# Patient Record
Sex: MALE | Race: WHITE | NOT HISPANIC OR LATINO | Employment: FULL TIME | URBAN - METROPOLITAN AREA
[De-identification: names, ages, dates, MRNs, and addresses within clinical notes are randomized per-mention and may not be internally consistent; named-entity substitution may affect disease eponyms.]

---

## 2021-12-23 ENCOUNTER — OFFICE VISIT (OUTPATIENT)
Dept: URGENT CARE | Facility: CLINIC | Age: 26
End: 2021-12-23
Payer: COMMERCIAL

## 2021-12-23 VITALS
HEART RATE: 90 BPM | HEIGHT: 73 IN | WEIGHT: 292 LBS | RESPIRATION RATE: 16 BRPM | DIASTOLIC BLOOD PRESSURE: 90 MMHG | SYSTOLIC BLOOD PRESSURE: 133 MMHG | BODY MASS INDEX: 38.7 KG/M2 | OXYGEN SATURATION: 98 % | TEMPERATURE: 98 F

## 2021-12-23 DIAGNOSIS — Z11.52 ENCOUNTER FOR SCREENING FOR COVID-19: Primary | ICD-10-CM

## 2021-12-23 LAB
CTP QC/QA: YES
SARS-COV-2 RDRP RESP QL NAA+PROBE: NEGATIVE

## 2021-12-23 PROCEDURE — U0002 COVID-19 LAB TEST NON-CDC: HCPCS | Mod: QW,S$GLB,, | Performed by: NURSE PRACTITIONER

## 2021-12-23 PROCEDURE — 99203 OFFICE O/P NEW LOW 30 MIN: CPT | Mod: S$GLB,,, | Performed by: NURSE PRACTITIONER

## 2021-12-23 PROCEDURE — 99203 PR OFFICE/OUTPT VISIT, NEW, LEVL III, 30-44 MIN: ICD-10-PCS | Mod: S$GLB,,, | Performed by: NURSE PRACTITIONER

## 2021-12-23 PROCEDURE — U0002: ICD-10-PCS | Mod: QW,S$GLB,, | Performed by: NURSE PRACTITIONER

## 2022-06-10 ENCOUNTER — OFFICE VISIT (OUTPATIENT)
Dept: FAMILY MEDICINE | Facility: CLINIC | Age: 27
End: 2022-06-10
Payer: COMMERCIAL

## 2022-06-10 VITALS
HEIGHT: 73 IN | BODY MASS INDEX: 39.23 KG/M2 | OXYGEN SATURATION: 98 % | HEART RATE: 132 BPM | WEIGHT: 296 LBS | SYSTOLIC BLOOD PRESSURE: 128 MMHG | DIASTOLIC BLOOD PRESSURE: 76 MMHG | TEMPERATURE: 102 F

## 2022-06-10 DIAGNOSIS — J02.9 PHARYNGITIS, UNSPECIFIED ETIOLOGY: ICD-10-CM

## 2022-06-10 DIAGNOSIS — J45.20 MILD INTERMITTENT ASTHMA, UNSPECIFIED WHETHER COMPLICATED: ICD-10-CM

## 2022-06-10 DIAGNOSIS — J40 BRONCHITIS: Primary | ICD-10-CM

## 2022-06-10 PROCEDURE — 99203 OFFICE O/P NEW LOW 30 MIN: CPT | Mod: S$GLB,,, | Performed by: FAMILY MEDICINE

## 2022-06-10 PROCEDURE — U0003 INFECTIOUS AGENT DETECTION BY NUCLEIC ACID (DNA OR RNA); SEVERE ACUTE RESPIRATORY SYNDROME CORONAVIRUS 2 (SARS-COV-2) (CORONAVIRUS DISEASE [COVID-19]), AMPLIFIED PROBE TECHNIQUE, MAKING USE OF HIGH THROUGHPUT TECHNOLOGIES AS DESCRIBED BY CMS-2020-01-R: HCPCS | Performed by: FAMILY MEDICINE

## 2022-06-10 PROCEDURE — 99203 PR OFFICE/OUTPT VISIT, NEW, LEVL III, 30-44 MIN: ICD-10-PCS | Mod: S$GLB,,, | Performed by: FAMILY MEDICINE

## 2022-06-10 PROCEDURE — U0005 INFEC AGEN DETEC AMPLI PROBE: HCPCS | Performed by: FAMILY MEDICINE

## 2022-06-10 RX ORDER — DOXYCYCLINE 100 MG/1
100 CAPSULE ORAL EVERY 12 HOURS
Qty: 20 CAPSULE | Refills: 0 | OUTPATIENT
Start: 2022-06-10 | End: 2022-07-03

## 2022-06-10 RX ORDER — ALBUTEROL SULFATE 90 UG/1
2 AEROSOL, METERED RESPIRATORY (INHALATION) EVERY 6 HOURS PRN
Qty: 18 G | Refills: 1 | Status: SHIPPED | OUTPATIENT
Start: 2022-06-10 | End: 2023-06-10

## 2022-06-10 RX ORDER — ONDANSETRON 4 MG/1
4 TABLET, FILM COATED ORAL EVERY 6 HOURS PRN
Qty: 10 TABLET | Refills: 0 | OUTPATIENT
Start: 2022-06-10 | End: 2022-07-03

## 2022-06-11 DIAGNOSIS — U07.1 COVID-19 VIRUS DETECTED: ICD-10-CM

## 2022-06-11 LAB — SARS-COV-2 RNA RESP QL NAA+PROBE: DETECTED

## 2022-06-12 PROBLEM — J45.20 MILD INTERMITTENT ASTHMA: Status: ACTIVE | Noted: 2022-06-12

## 2022-06-13 NOTE — PROGRESS NOTES
Subjective:       Patient ID: Thom Emmanuel III, III is a 26 y.o. male.    Chief Complaint: Sinus Problem    New patient visit.  Last year many years ago.  COVID immunization back in August of 2021. Bronchitis pharyngitis symptoms.  Does have mild intermittent asthma.  New baby born 2 days ago.  About go home from the hospital today.  Got sick yesterday morning with 101.9 degree fever.  Ears are okay.  Throat sore.  Vomited this morning.  Sinus pressure frontal.  Coughing a lot.  Some sputum this morning.  One episode of vomiting.  5-year-old had the flu 2 weeks ago.    Physical examination vital signs noted.  No acute distress.  Tympanic membranes without erythema.  Pharynx slightly reddened.  Neck without adenopathy.  Chest coarse breath sounds.    Heart regular rate rhythm.    Objective:        Assessment:       1. Bronchitis    2. Mild intermittent asthma, unspecified whether complicated    3. Pharyngitis, unspecified etiology        Plan:       Bronchitis  -     Cancel: COVID-19 Routine Screening    Mild intermittent asthma, unspecified whether complicated    Pharyngitis, unspecified etiology    Other orders  -     doxycycline (VIBRAMYCIN) 100 MG Cap; Take 1 capsule (100 mg total) by mouth every 12 (twelve) hours.  Dispense: 20 capsule; Refill: 0  -     albuterol (PROVENTIL HFA) 90 mcg/actuation inhaler; Inhale 2 puffs into the lungs every 6 (six) hours as needed for Wheezing. Rescue  Dispense: 18 g; Refill: 1  -     ondansetron (ZOFRAN) 4 MG tablet; Take 1 tablet (4 mg total) by mouth every 6 (six) hours as needed for Nausea.  Dispense: 10 tablet; Refill: 0  -     COVID-19 Routine Screening      A COVID nasal swab.  Isolation.  Vibramycin 100 mg b.i.d. for 10 days.  Albuterol HFA 2 puffs q.4-6 hours p.r.n. wheezing 1 with 2 refills.  Zofran 4 mg q.6 hours p.r.n. nausea.  Ten pills.  Isolation from mother

## 2022-07-03 ENCOUNTER — HOSPITAL ENCOUNTER (EMERGENCY)
Facility: HOSPITAL | Age: 27
Discharge: HOME | End: 2022-07-03
Attending: EMERGENCY MEDICINE
Payer: COMMERCIAL

## 2022-07-03 VITALS
OXYGEN SATURATION: 98 % | TEMPERATURE: 98 F | HEART RATE: 95 BPM | DIASTOLIC BLOOD PRESSURE: 66 MMHG | SYSTOLIC BLOOD PRESSURE: 105 MMHG | WEIGHT: 280 LBS | HEIGHT: 73 IN | RESPIRATION RATE: 17 BRPM | BODY MASS INDEX: 37.11 KG/M2

## 2022-07-03 DIAGNOSIS — T78.2XXA ANAPHYLAXIS, INITIAL ENCOUNTER: Primary | ICD-10-CM

## 2022-07-03 LAB
ALBUMIN SERPL BCP-MCNC: 4.8 G/DL (ref 3.5–5.2)
ALP SERPL-CCNC: 92 U/L (ref 55–135)
ALT SERPL W/O P-5'-P-CCNC: 63 U/L (ref 10–44)
ANION GAP SERPL CALC-SCNC: 10 MMOL/L (ref 8–16)
AST SERPL-CCNC: 38 U/L (ref 10–40)
BASOPHILS # BLD AUTO: 0.05 K/UL (ref 0–0.2)
BASOPHILS NFR BLD: 0.5 % (ref 0–1.9)
BILIRUB SERPL-MCNC: 0.5 MG/DL (ref 0.1–1)
BUN SERPL-MCNC: 15 MG/DL (ref 6–20)
CALCIUM SERPL-MCNC: 9.9 MG/DL (ref 8.7–10.5)
CHLORIDE SERPL-SCNC: 100 MMOL/L (ref 95–110)
CO2 SERPL-SCNC: 26 MMOL/L (ref 23–29)
CREAT SERPL-MCNC: 1 MG/DL (ref 0.5–1.4)
DIFFERENTIAL METHOD: NORMAL
EOSINOPHIL # BLD AUTO: 0.2 K/UL (ref 0–0.5)
EOSINOPHIL NFR BLD: 1.4 % (ref 0–8)
ERYTHROCYTE [DISTWIDTH] IN BLOOD BY AUTOMATED COUNT: 13 % (ref 11.5–14.5)
EST. GFR  (AFRICAN AMERICAN): >60 ML/MIN/1.73 M^2
EST. GFR  (NON AFRICAN AMERICAN): >60 ML/MIN/1.73 M^2
GLUCOSE SERPL-MCNC: 106 MG/DL (ref 70–110)
HCT VFR BLD AUTO: 44 % (ref 40–54)
HGB BLD-MCNC: 15.5 G/DL (ref 14–18)
IMM GRANULOCYTES # BLD AUTO: 0.03 K/UL (ref 0–0.04)
IMM GRANULOCYTES NFR BLD AUTO: 0.3 % (ref 0–0.5)
LYMPHOCYTES # BLD AUTO: 3.1 K/UL (ref 1–4.8)
LYMPHOCYTES NFR BLD: 28.4 % (ref 18–48)
MCH RBC QN AUTO: 29.7 PG (ref 27–31)
MCHC RBC AUTO-ENTMCNC: 35.2 G/DL (ref 32–36)
MCV RBC AUTO: 84 FL (ref 82–98)
MONOCYTES # BLD AUTO: 0.7 K/UL (ref 0.3–1)
MONOCYTES NFR BLD: 6.7 % (ref 4–15)
NEUTROPHILS # BLD AUTO: 6.8 K/UL (ref 1.8–7.7)
NEUTROPHILS NFR BLD: 62.7 % (ref 38–73)
NRBC BLD-RTO: 0 /100 WBC
PLATELET # BLD AUTO: 296 K/UL (ref 150–450)
PMV BLD AUTO: 12.3 FL (ref 9.2–12.9)
POTASSIUM SERPL-SCNC: 3.6 MMOL/L (ref 3.5–5.1)
PROT SERPL-MCNC: 8.2 G/DL (ref 6–8.4)
RBC # BLD AUTO: 5.22 M/UL (ref 4.6–6.2)
SODIUM SERPL-SCNC: 136 MMOL/L (ref 136–145)
WBC # BLD AUTO: 10.9 K/UL (ref 3.9–12.7)

## 2022-07-03 PROCEDURE — 96374 THER/PROPH/DIAG INJ IV PUSH: CPT

## 2022-07-03 PROCEDURE — 25000003 PHARM REV CODE 250

## 2022-07-03 PROCEDURE — 25000242 PHARM REV CODE 250 ALT 637 W/ HCPCS: Performed by: EMERGENCY MEDICINE

## 2022-07-03 PROCEDURE — 96361 HYDRATE IV INFUSION ADD-ON: CPT

## 2022-07-03 PROCEDURE — 63600175 PHARM REV CODE 636 W HCPCS: Performed by: EMERGENCY MEDICINE

## 2022-07-03 PROCEDURE — 25000003 PHARM REV CODE 250: Performed by: EMERGENCY MEDICINE

## 2022-07-03 PROCEDURE — 94640 AIRWAY INHALATION TREATMENT: CPT

## 2022-07-03 PROCEDURE — 99284 EMERGENCY DEPT VISIT MOD MDM: CPT | Mod: 25

## 2022-07-03 PROCEDURE — 96372 THER/PROPH/DIAG INJ SC/IM: CPT | Performed by: EMERGENCY MEDICINE

## 2022-07-03 PROCEDURE — 80053 COMPREHEN METABOLIC PANEL: CPT | Performed by: EMERGENCY MEDICINE

## 2022-07-03 PROCEDURE — 85025 COMPLETE CBC W/AUTO DIFF WBC: CPT | Performed by: EMERGENCY MEDICINE

## 2022-07-03 PROCEDURE — 94761 N-INVAS EAR/PLS OXIMETRY MLT: CPT

## 2022-07-03 RX ORDER — PREDNISONE 20 MG/1
40 TABLET ORAL DAILY
Qty: 10 TABLET | Refills: 0 | Status: SHIPPED | OUTPATIENT
Start: 2022-07-03 | End: 2022-07-08

## 2022-07-03 RX ORDER — METHYLPREDNISOLONE SOD SUCC 125 MG
125 VIAL (EA) INJECTION
Status: COMPLETED | OUTPATIENT
Start: 2022-07-03 | End: 2022-07-03

## 2022-07-03 RX ORDER — EPINEPHRINE 0.3 MG/.3ML
0.3 INJECTION SUBCUTANEOUS
Status: COMPLETED | OUTPATIENT
Start: 2022-07-03 | End: 2022-07-03

## 2022-07-03 RX ORDER — EPINEPHRINE 0.3 MG/.3ML
INJECTION SUBCUTANEOUS
Status: COMPLETED
Start: 2022-07-03 | End: 2022-07-03

## 2022-07-03 RX ORDER — DIPHENHYDRAMINE HCL 25 MG
25 CAPSULE ORAL EVERY 6 HOURS PRN
COMMUNITY

## 2022-07-03 RX ORDER — IPRATROPIUM BROMIDE AND ALBUTEROL SULFATE 2.5; .5 MG/3ML; MG/3ML
3 SOLUTION RESPIRATORY (INHALATION)
Status: COMPLETED | OUTPATIENT
Start: 2022-07-03 | End: 2022-07-03

## 2022-07-03 RX ORDER — SODIUM CHLORIDE 9 MG/ML
1000 INJECTION, SOLUTION INTRAVENOUS
Status: COMPLETED | OUTPATIENT
Start: 2022-07-03 | End: 2022-07-03

## 2022-07-03 RX ORDER — EPINEPHRINE 0.3 MG/.3ML
1 INJECTION SUBCUTANEOUS
Qty: 1 EACH | Refills: 3 | Status: SHIPPED | OUTPATIENT
Start: 2022-07-03 | End: 2023-07-03

## 2022-07-03 RX ORDER — FAMOTIDINE 10 MG/ML
40 INJECTION INTRAVENOUS
Status: COMPLETED | OUTPATIENT
Start: 2022-07-03 | End: 2022-07-03

## 2022-07-03 RX ADMIN — METHYLPREDNISOLONE SODIUM SUCCINATE 125 MG: 125 INJECTION, POWDER, FOR SOLUTION INTRAMUSCULAR; INTRAVENOUS at 06:07

## 2022-07-03 RX ADMIN — IPRATROPIUM BROMIDE AND ALBUTEROL SULFATE 3 ML: .5; 3 SOLUTION RESPIRATORY (INHALATION) at 07:07

## 2022-07-03 RX ADMIN — EPINEPHRINE 0.3 MG: 0.3 INJECTION SUBCUTANEOUS at 06:07

## 2022-07-03 RX ADMIN — FAMOTIDINE 40 MG: 10 INJECTION, SOLUTION INTRAVENOUS at 06:07

## 2022-07-03 RX ADMIN — SODIUM CHLORIDE 1000 ML: 0.9 INJECTION, SOLUTION INTRAVENOUS at 06:07

## 2022-07-04 NOTE — ED PROVIDER NOTES
Encounter Date: 7/3/2022       History     Chief Complaint   Patient presents with    Allergic Reaction     26-year-old male with history of mild intermittent asthma and anaphylactic reaction to peanut presents to the ER for acute onset of allergic reaction symptoms after eating boot in balls from a restaurant.  Patient reports he has had no issues with food and balls in the past and has had no issue with peanut oil which they reportedly dejesus in.  But developed within 30 minutes of eating the food lip swelling face swelling and redness with periorbital redness and itchy red eyes shortness of breath with throat swelling and wheezing as well as itchy palms and soles.  He also reports mild nausea.  No vomiting no dizziness or lightheadedness and no syncope.  No diarrhea he reports in the past he has gotten highs with allergic reactions but did not get hives with this.  He did not have an EpiPen available he did take 5 tablets of 25 mg Benadryl and came immediately to the ER.  He reports earlier today he is playing golf in the heat and was drinking alcoholic beverages        Review of patient's allergies indicates:   Allergen Reactions    Peanut Hives, Shortness Of Breath and Swelling     Past Medical History:   Diagnosis Date    Asthma, mild intermittent      Past Surgical History:   Procedure Laterality Date    FINGER SURGERY  9-10-13    right thumb    FRACTURE SURGERY       History reviewed. No pertinent family history.  Social History     Tobacco Use    Smoking status: Never Smoker    Smokeless tobacco: Never Used   Substance Use Topics    Alcohol use: No    Drug use: Never     Review of Systems   Constitutional: Negative for activity change, appetite change, chills, diaphoresis, fatigue and fever.   HENT: Positive for facial swelling. Negative for congestion, drooling, postnasal drip, rhinorrhea, sinus pain, sore throat, trouble swallowing and voice change.         Throat swelling   Eyes: Positive for  redness and itching.   Respiratory: Positive for shortness of breath. Negative for cough, chest tightness and wheezing.    Cardiovascular: Positive for palpitations. Negative for chest pain.   Gastrointestinal: Positive for nausea. Negative for abdominal pain, constipation, diarrhea and vomiting.   Musculoskeletal: Negative for neck pain and neck stiffness.   Skin: Positive for color change. Negative for rash.   Neurological: Negative for dizziness, weakness, light-headedness, numbness and headaches.   Psychiatric/Behavioral: Negative for agitation.   All other systems reviewed and are negative.      Physical Exam     Initial Vitals [07/03/22 1830]   BP Pulse Resp Temp SpO2   (!) 157/76 (!) 115 14 98.3 °F (36.8 °C) (!) 94 %      MAP       --         Physical Exam    Nursing note and vitals reviewed.  Constitutional: He appears well-developed and well-nourished. He is not diaphoretic. No distress.   HENT:   Head: Normocephalic and atraumatic.   Right Ear: External ear normal.   Left Ear: External ear normal.   Nose: Nose normal.   Mouth/Throat: Oropharynx is clear and moist. No oropharyngeal exudate.    erythema to the face with edema of the lips cheeks and periorbital areas as well as upper lower lids and scleral injection.  And tearing of the eyes.  No intraoral swelling normal phonation.   Eyes: EOM are normal. Pupils are equal, round, and reactive to light. Right eye exhibits discharge. Left eye exhibits discharge.   Injected sclera bilaterally mildly edematous upper and lower lids bilaterally with tearing   Neck: Neck supple. No tracheal deviation present.   No visualize neck swelling normal phonation normal ability to swallow   Normal range of motion.  Cardiovascular: Regular rhythm, normal heart sounds and intact distal pulses. Exam reveals no gallop and no friction rub.    No murmur heard.  Heart rate 120 sinus tach blood pressure 134/69   Pulmonary/Chest: No stridor. No respiratory distress. He has wheezes.  He has no rhonchi. He has no rales. He exhibits no tenderness.   Sats 96-98% on room air   Abdominal: Abdomen is soft. Bowel sounds are normal. He exhibits no distension and no mass. There is no abdominal tenderness. There is no rebound and no guarding.   Musculoskeletal:         General: No edema. Normal range of motion.      Cervical back: Normal range of motion and neck supple.     Neurological: He is alert and oriented to person, place, and time. He has normal strength. No cranial nerve deficit or sensory deficit.   Skin: Skin is warm and dry. No rash noted. No erythema. No pallor.   No pallor no rash    Erythema to cheeks   Psychiatric: He has a normal mood and affect. His behavior is normal. Judgment and thought content normal.         ED Course   Procedures  Labs Reviewed   COMPREHENSIVE METABOLIC PANEL - Abnormal; Notable for the following components:       Result Value    ALT 63 (*)     All other components within normal limits   CBC W/ AUTO DIFFERENTIAL          Imaging Results    None          Medications   EPINEPHrine (EPIPEN) 0.3 mg/0.3 mL pen injection 0.3 mg (0.3 mg Intramuscular Given 7/3/22 1835)   methylPREDNISolone sodium succinate injection 125 mg (125 mg Intramuscular Given 7/3/22 1837)   famotidine (PF) injection 40 mg (40 mg Intravenous Given 7/3/22 1838)   0.9%  NaCl infusion (0 mLs Intravenous Stopped 7/3/22 2006)   albuterol-ipratropium 2.5 mg-0.5 mg/3 mL nebulizer solution 3 mL (3 mLs Nebulization Given 7/3/22 1915)     Medical Decision Making:   Clinical Tests:   Lab Tests: Ordered and Reviewed  The following lab test(s) were unremarkable: CBC       <> Summary of Lab: ALT 63  ED Management:  26-year-old male with history of mild intermittent asthma and anaphylactic reaction to peanut presents to the ER for acute onset of allergic reaction symptoms after eating boot in balls from a restaurant.  Patient reports he has had no issues with food and balls in the past and has had no issue with  peanut oil which they reportedly dejesus in.  But developed within 30 minutes of eating the food lip swelling face swelling and redness with periorbital redness and itchy red eyes shortness of breath with throat swelling and wheezing as well as itchy palms and soles.  He also reports mild nausea.  No vomiting no dizziness or lightheadedness and no syncope.  No diarrhea he reports in the past he has gotten highs with allergic reactions but did not get hives with this.  He did not have an EpiPen available he did take 5 tablets of 25 mg Benadryl and came immediately to the ER.  He reports earlier today he is playing golf in the heat and was drinking alcoholic beverages  On physical exam patient was brought immediately to a room due to his severe symptoms he had a heart rate in the 120s normal blood pressure afebrile sats were 96% on room air respirations 12-14 patient had erythema to the face with edema of the lips cheeks and periorbital areas as well as upper lower lids and scleral injection.  And tearing of the eyes.  No intraoral swelling normal phonation.  Wheezes mildly bilaterally.  Oxygen saturations of 96-98% with deep breathing.  Soft nontender abdomen.  No hives.  He was given Solu-Medrol 125 mg IV, Pepcid 40 mg IV, a L fluids, and 0.3 mg of intramuscular epinephrine within several minutes patient reported that he was feeling improved.  The redness in his face has improved the swelling of the face is improved a reports his breathing is improved.  He will be observed for at least 4 hours.  CBC and CMP have been ordered and are normal.  Izabel Wise M.D.  8:56 PM 7/3/2022    Symptoms have all resolved patient has been sleeping comfortably normal vital signs no distress will be discharged home with EpiPen, prednisone, will take Pepcid and Benadryl for the next 3 days referred to Allergy immunology.  Izabel Wise M.D.  10:35 PM 7/3/2022               ED Course as of 07/03/22 2235   Sun Jul 03, 2022 2111  Patient doing well sleeping comfortably normal oxygen saturations all edema in the face and redness has resolved.  Izabel Wise M.D.  9:11 PM 7/3/2022   [RM]      ED Course User Index  [RM] Izabel Wise MD             Clinical Impression:   Final diagnoses:  [T78.2XXA] Anaphylaxis, initial encounter (Primary)                 Izabel Wise MD  07/03/22 1705

## 2022-07-10 ENCOUNTER — HOSPITAL ENCOUNTER (OUTPATIENT)
Dept: RADIOLOGY | Facility: HOSPITAL | Age: 27
Discharge: HOME | End: 2022-07-10
Attending: NURSE PRACTITIONER
Payer: COMMERCIAL

## 2022-07-10 ENCOUNTER — OFFICE VISIT (OUTPATIENT)
Dept: URGENT CARE | Facility: CLINIC | Age: 27
End: 2022-07-10
Payer: COMMERCIAL

## 2022-07-10 VITALS
HEART RATE: 77 BPM | BODY MASS INDEX: 38.17 KG/M2 | HEIGHT: 73 IN | WEIGHT: 288 LBS | DIASTOLIC BLOOD PRESSURE: 86 MMHG | SYSTOLIC BLOOD PRESSURE: 135 MMHG | RESPIRATION RATE: 18 BRPM | TEMPERATURE: 98 F | OXYGEN SATURATION: 95 %

## 2022-07-10 DIAGNOSIS — M79.644 PAIN IN RIGHT FINGER(S): ICD-10-CM

## 2022-07-10 DIAGNOSIS — S60.459A FOREIGN BODY OF FINGER WITH INFECTION, INITIAL ENCOUNTER: ICD-10-CM

## 2022-07-10 DIAGNOSIS — T63.691A MARINE ANIMAL STING, ACCIDENTAL OR UNINTENTIONAL, INITIAL ENCOUNTER: ICD-10-CM

## 2022-07-10 DIAGNOSIS — L03.011 CELLULITIS OF RIGHT LITTLE FINGER: ICD-10-CM

## 2022-07-10 DIAGNOSIS — L08.9 FOREIGN BODY OF FINGER WITH INFECTION, INITIAL ENCOUNTER: ICD-10-CM

## 2022-07-10 DIAGNOSIS — T63.691A MARINE ANIMAL STING, ACCIDENTAL OR UNINTENTIONAL, INITIAL ENCOUNTER: Primary | ICD-10-CM

## 2022-07-10 PROCEDURE — 29131 PR APPLY FINGER SPLINT,DYNAMIC: ICD-10-PCS | Mod: S$GLB,,, | Performed by: NURSE PRACTITIONER

## 2022-07-10 PROCEDURE — 29131 APPL FINGER SPLINT DYNAMIC: CPT | Mod: S$GLB,,, | Performed by: NURSE PRACTITIONER

## 2022-07-10 PROCEDURE — 99214 PR OFFICE/OUTPT VISIT, EST, LEVL IV, 30-39 MIN: ICD-10-PCS | Mod: 25,S$GLB,, | Performed by: NURSE PRACTITIONER

## 2022-07-10 PROCEDURE — 99214 OFFICE O/P EST MOD 30 MIN: CPT | Mod: 25,S$GLB,, | Performed by: NURSE PRACTITIONER

## 2022-07-10 PROCEDURE — 73130 X-RAY EXAM OF HAND: CPT | Mod: TC,RT

## 2022-07-10 RX ORDER — DOXYCYCLINE HYCLATE 100 MG
100 TABLET ORAL EVERY 12 HOURS
Qty: 20 TABLET | Refills: 0 | Status: SHIPPED | OUTPATIENT
Start: 2022-07-10 | End: 2022-07-20

## 2022-07-10 RX ORDER — MUPIROCIN 20 MG/G
OINTMENT TOPICAL 3 TIMES DAILY
Qty: 22 G | Refills: 0 | Status: SHIPPED | OUTPATIENT
Start: 2022-07-10 | End: 2022-07-17

## 2022-07-10 RX ORDER — CEFPODOXIME PROXETIL 100 MG/1
200 TABLET, FILM COATED ORAL EVERY 12 HOURS
Qty: 40 TABLET | Refills: 0 | Status: SHIPPED | OUTPATIENT
Start: 2022-07-10 | End: 2022-07-20

## 2022-07-10 RX ORDER — HYDROCODONE BITARTRATE AND ACETAMINOPHEN 5; 325 MG/1; MG/1
1 TABLET ORAL EVERY 6 HOURS PRN
Qty: 8 TABLET | Refills: 0 | Status: SHIPPED | OUTPATIENT
Start: 2022-07-10

## 2022-07-10 NOTE — PATIENT INSTRUCTIONS
Keep wound clean and dry. Use wound cleanser to clean wound. Apply mupirocin as directed. Change bandage daily and wear splint to protect finger until healed  Get xrays taken at local er. Someone will call you with results  Take antibiotics as ordered until complete. Separate two different antibiotics by at least 3 hours  Take otc ibuprofen for pain. May take norco for breakthrough pain  Follow up with your PCP  Return to clinic for any new concern

## 2022-07-10 NOTE — PROGRESS NOTES
"Subjective:       Patient ID: Thom Emmanuel III is a 26 y.o. male.    Vitals:  height is 6' 1" (1.854 m) and weight is 130.6 kg (288 lb). His oral temperature is 97.5 °F (36.4 °C). His blood pressure is 135/86 and his pulse is 77. His respiration is 18 and oxygen saturation is 95%.     Chief Complaint: Hand Pain    Yesterday the pt went fishing and a catfish stuck him in his right pinky finger. Pt doesn't know if it's still in there or not. Pt finger is red and swollen.     Hand Pain   The incident occurred 6 to 12 hours ago. The injury mechanism was a direct blow. The pain is present in the right fingers. The quality of the pain is described as aching. The pain is at a severity of 6/10. The pain is moderate. Pertinent negatives include no chest pain. He has tried NSAIDs for the symptoms. The treatment provided no relief.       Constitution: Negative for chills and fever.   Cardiovascular: Negative for chest pain, palpitations and sob on exertion.   Respiratory: Negative for chest tightness and shortness of breath.    Gastrointestinal: Negative for nausea and vomiting.   Musculoskeletal: Positive for pain. Negative for muscle ache.   Skin: Positive for puncture wound and erythema.   Neurological: Negative for dizziness, history of vertigo, light-headedness, disorientation and altered mental status.   Psychiatric/Behavioral: Negative for altered mental status, disorientation and confusion.       Objective:      Physical Exam   Constitutional: He is oriented to person, place, and time. He appears well-developed. He is cooperative.  Non-toxic appearance. He does not appear ill. No distress.   HENT:   Head: Normocephalic and atraumatic.   Ears:   Right Ear: Hearing and external ear normal.   Left Ear: Hearing and external ear normal.   Nose: Nose normal. No mucosal edema, rhinorrhea or nasal deformity. No epistaxis. Right sinus exhibits no maxillary sinus tenderness and no frontal sinus tenderness. Left sinus " exhibits no maxillary sinus tenderness and no frontal sinus tenderness.   Mouth/Throat: Uvula is midline, oropharynx is clear and moist and mucous membranes are normal. No trismus in the jaw. Normal dentition. No uvula swelling. No posterior oropharyngeal erythema.   Eyes: Conjunctivae and lids are normal. Right eye exhibits no discharge. Left eye exhibits no discharge. No scleral icterus.   Neck: Trachea normal and phonation normal. Neck supple.   Cardiovascular: Normal rate, regular rhythm, normal heart sounds and normal pulses.   Pulmonary/Chest: Effort normal and breath sounds normal. No respiratory distress.   Abdominal: Normal appearance.   Musculoskeletal: Normal range of motion.         General: No deformity. Normal range of motion.        Hands:    Neurological: He is alert and oriented to person, place, and time. He exhibits normal muscle tone. Coordination normal.   Skin: Skin is warm, dry, intact, not diaphoretic and not pale. Capillary refill takes 2 to 3 seconds. erythema   Psychiatric: His speech is normal and behavior is normal. Judgment and thought content normal.   Nursing note and vitals reviewed.        Assessment:       1. Marine animal sting, accidental or unintentional, initial encounter    2. Pain in right finger(s)    3. Cellulitis of right little finger          Plan:         Marine animal sting, accidental or unintentional, initial encounter  -     mupirocin (BACTROBAN) 2 % ointment; Apply topically 3 (three) times daily. for 7 days  Dispense: 22 g; Refill: 0  -     cefpodoxime (VANTIN) 100 MG tablet; Take 2 tablets (200 mg total) by mouth every 12 (twelve) hours. for 10 days  Dispense: 40 tablet; Refill: 0  -     doxycycline (VIBRA-TABS) 100 MG tablet; Take 1 tablet (100 mg total) by mouth every 12 (twelve) hours. for 10 days  Dispense: 20 tablet; Refill: 0  -     Splint application  -     XR HAND COMPLETE 3 VIEW RIGHT; Future; Expected date: 07/10/2022  -     E - OTHER    Pain in right  finger(s)  -     HYDROcodone-acetaminophen (NORCO) 5-325 mg per tablet; Take 1 tablet by mouth every 6 (six) hours as needed for Pain.  Dispense: 8 tablet; Refill: 0    Cellulitis of right little finger  -     cefpodoxime (VANTIN) 100 MG tablet; Take 2 tablets (200 mg total) by mouth every 12 (twelve) hours. for 10 days  Dispense: 40 tablet; Refill: 0  -     doxycycline (VIBRA-TABS) 100 MG tablet; Take 1 tablet (100 mg total) by mouth every 12 (twelve) hours. for 10 days  Dispense: 20 tablet; Refill: 0        I have discussed the test results and physical exam findings with the patient. We discussed strict symptom monitoring, treatment options, medication use, and follow up orders.  he verbalized understanding and agreement with the plan of care.        Keep wound clean and dry. Use wound cleanser to clean wound. Apply mupirocin as directed. Change bandage daily and wear splint to protect finger until healed  Get xrays taken at local er. Someone will call you with results  Take antibiotics as ordered until complete. Separate two different antibiotics by at least 3 hours  Take otc ibuprofen for pain. May take norco for breakthrough pain  Follow up with your PCP  Return to clinic for any new concern     07/10/2022=Right hand 3 views     CLINICAL DATA: Puncture wound, fifth digit     FINDINGS: 3 views demonstrate no fracture, dislocation, or osseous destructive lesion.     4 mm x 1 mm soft tissue foreign body is noted along the anterior aspect of the distal phalanx of the fifth finger near its base, approximately 3 mm deep to the skin surface.     IMPRESSION:  1. Radiopaque soft tissue foreign body anteriorly near the base of the distal phalanx of the fifth finger. By report, this reflects fish fin fragment.  2. No osseous abnormalities    Pt. Referred to orthopedic hand specialist. Pt. Contacted by phone and notified. Understanding verbalized.

## 2024-08-26 ENCOUNTER — OFFICE VISIT (OUTPATIENT)
Dept: URGENT CARE | Facility: CLINIC | Age: 29
End: 2024-08-26
Payer: COMMERCIAL

## 2024-08-26 VITALS
SYSTOLIC BLOOD PRESSURE: 130 MMHG | RESPIRATION RATE: 16 BRPM | TEMPERATURE: 97 F | HEART RATE: 74 BPM | OXYGEN SATURATION: 97 % | DIASTOLIC BLOOD PRESSURE: 87 MMHG

## 2024-08-26 DIAGNOSIS — J02.9 SORE THROAT: Primary | ICD-10-CM

## 2024-08-26 DIAGNOSIS — U07.1 COVID-19: ICD-10-CM

## 2024-08-26 LAB
CTP QC/QA: YES
FLUAV AG NPH QL: NEGATIVE
FLUBV AG NPH QL: NEGATIVE
S PYO RRNA THROAT QL PROBE: NEGATIVE
SARS-COV-2 AG RESP QL IA.RAPID: POSITIVE

## 2024-08-26 PROCEDURE — 87804 INFLUENZA ASSAY W/OPTIC: CPT | Mod: QW,,, | Performed by: STUDENT IN AN ORGANIZED HEALTH CARE EDUCATION/TRAINING PROGRAM

## 2024-08-26 PROCEDURE — 87811 SARS-COV-2 COVID19 W/OPTIC: CPT | Mod: QW,S$GLB,, | Performed by: STUDENT IN AN ORGANIZED HEALTH CARE EDUCATION/TRAINING PROGRAM

## 2024-08-26 PROCEDURE — 99214 OFFICE O/P EST MOD 30 MIN: CPT | Mod: S$GLB,,, | Performed by: STUDENT IN AN ORGANIZED HEALTH CARE EDUCATION/TRAINING PROGRAM

## 2024-08-26 PROCEDURE — 87880 STREP A ASSAY W/OPTIC: CPT | Mod: QW,,, | Performed by: STUDENT IN AN ORGANIZED HEALTH CARE EDUCATION/TRAINING PROGRAM

## 2024-08-26 RX ORDER — CETIRIZINE HYDROCHLORIDE 10 MG/1
10 TABLET ORAL DAILY
Qty: 30 TABLET | Refills: 0 | Status: SHIPPED | OUTPATIENT
Start: 2024-08-26

## 2024-08-26 RX ORDER — PROMETHAZINE HYDROCHLORIDE AND DEXTROMETHORPHAN HYDROBROMIDE 6.25; 15 MG/5ML; MG/5ML
5 SYRUP ORAL EVERY 4 HOURS PRN
Qty: 118 ML | Refills: 0 | Status: SHIPPED | OUTPATIENT
Start: 2024-08-26 | End: 2024-09-05

## 2024-08-26 RX ORDER — AZELASTINE 1 MG/ML
2 SPRAY, METERED NASAL 2 TIMES DAILY
Qty: 30 ML | Refills: 0 | Status: SHIPPED | OUTPATIENT
Start: 2024-08-26 | End: 2025-08-26

## 2024-08-26 NOTE — PROGRESS NOTES
Subjective:      Patient ID: Thom Emmanuel III is a 28 y.o. male.    Vitals:  temperature is 97 °F (36.1 °C). His blood pressure is 130/87 and his pulse is 74. His respiration is 16 and oxygen saturation is 97%.     Chief Complaint: URI    Patient is a 28-year-old male with a past medical history of asthma who presents to clinic for evaluation of URI related symptoms.  Patient reports symptoms times 2-3 days now.  Patient reports over-the-counter Benadryl, ibuprofen, and emergen-c with mild relief to symptoms.  Patient reports no recent or known sick exposure however states he wife works as a teacher in his child is back in school.  Patient reports that he is vaccinated.        Constitution: Positive for chills and fatigue.   HENT:  Positive for congestion, postnasal drip and sore throat. Negative for ear pain.    Neck: neck negative.   Cardiovascular: Negative.  Negative for chest pain and palpitations.   Eyes: Negative.    Respiratory:  Positive for cough. Negative for chest tightness, sputum production and shortness of breath.    Gastrointestinal: Negative.  Negative for abdominal pain, nausea, vomiting and diarrhea.   Endocrine: negative.   Genitourinary: Negative.    Musculoskeletal:  Positive for muscle ache.   Skin: Negative.  Negative for color change, pale, rash and erythema.   Allergic/Immunologic: Negative.    Neurological:  Positive for headaches. Negative for dizziness, light-headedness, passing out, disorientation and altered mental status.   Hematologic/Lymphatic: Negative.    Psychiatric/Behavioral: Negative.  Negative for altered mental status, disorientation and confusion.       Objective:     Physical Exam   Constitutional: He is oriented to person, place, and time. He appears well-developed. He is cooperative.  Non-toxic appearance. He does not appear ill. No distress.   HENT:   Head: Normocephalic and atraumatic.   Ears:   Right Ear: Hearing, tympanic membrane, external ear and ear canal  normal.   Left Ear: Hearing, tympanic membrane, external ear and ear canal normal.   Nose: Nose normal. No mucosal edema, rhinorrhea, nasal deformity or congestion. No epistaxis. Right sinus exhibits no maxillary sinus tenderness and no frontal sinus tenderness. Left sinus exhibits no maxillary sinus tenderness and no frontal sinus tenderness.   Mouth/Throat: Uvula is midline, oropharynx is clear and moist and mucous membranes are normal. Mucous membranes are moist. No trismus in the jaw. Normal dentition. No uvula swelling. No oropharyngeal exudate or posterior oropharyngeal erythema. Oropharynx is clear.   Eyes: Conjunctivae and lids are normal. Pupils are equal, round, and reactive to light. Right eye exhibits no discharge. Left eye exhibits no discharge. No scleral icterus.   Neck: Trachea normal and phonation normal. Neck supple. No neck rigidity present.   Cardiovascular: Normal rate, regular rhythm, normal heart sounds and normal pulses.   Pulmonary/Chest: Effort normal and breath sounds normal. No respiratory distress (Unlabored.  Equal rise and fall of chest.  Able speak in full complete sentences.  No adventitious breath sounds noted.). He has no wheezes. He has no rhonchi. He has no rales.   Abdominal: Normal appearance and bowel sounds are normal. He exhibits no distension. Soft. There is no abdominal tenderness.   Musculoskeletal: Normal range of motion.         General: Normal range of motion.      Cervical back: He exhibits no tenderness.   Lymphadenopathy:     He has no cervical adenopathy.   Neurological: He is alert and oriented to person, place, and time. He exhibits normal muscle tone.   Skin: Skin is warm, dry, intact, not diaphoretic, not pale and no rash. Capillary refill takes less than 2 seconds. No erythema   Psychiatric: His speech is normal and behavior is normal. Judgment and thought content normal.   Nursing note and vitals reviewed.      Assessment:     1. Sore throat    2. COVID-19         Plan:       Sore throat  -     SARS Coronavirus 2 Antigen, POCT Manual Read  -     POCT Influenza A/B Rapid Antigen  -     POCT rapid strep A    COVID-19    Other orders  -     cetirizine (ZYRTEC) 10 MG tablet; Take 1 tablet (10 mg total) by mouth once daily.  Dispense: 30 tablet; Refill: 0  -     azelastine (ASTELIN) 137 mcg (0.1 %) nasal spray; 2 sprays (274 mcg total) by Nasal route 2 (two) times daily.  Dispense: 30 mL; Refill: 0  -     promethazine-dextromethorphan (PROMETHAZINE-DM) 6.25-15 mg/5 mL Syrp; Take 5 mLs by mouth every 4 (four) hours as needed (Cough).  Dispense: 118 mL; Refill: 0                Labs:  COVID positive.  Influenza a and B negative.  Rapid strep negative.  Quarantine as directed.  Quarantine information provided to patient.  COVID recommendations provided.  (Vitamin-C 2000 mg daily, vitamin D3 1000 IU daily, Pepcid 40 mg daily, Zyrtec 10 mg daily, zinc 50 mg daily)  Tylenol per package instructions for any pain or fever.  May rotate with Motrin if unable to control pain or fever along with Tylenol.  Monitor home SpO2 and present to emergency department with readings less than 92%.  Take medications as prescribed.  Assure adequate hydration.  Follow-up with PCP in 1-2 days.  Return to clinic as needed.  To ED for any new or acutely worsening symptoms including but not limited to chest pain, palpitations, shortness of breath, or fever greater than 103° F.  Patient in agreement with plan of care.    DISCLAIMER: Please note that my documentation in this Electronic Healthcare Record was produced using speech recognition software and therefore may contain errors related to that software system.These could include grammar, punctuation and spelling errors or the inclusion/exclusion of phrases that were not intended. Garbled syntax, mangled pronouns, and other bizarre constructions may be attributed to that software system.

## 2024-08-26 NOTE — LETTER
August 26, 2024      [facility]  [address]  [facility] [address]  Phone: [phone]       Patient: Thom Emmanuel   YOB: 1995  Date of Visit: 08/26/2024    To Whom It May Concern:    Adrian Emmanuel  was at [facility] on 08/26/2024. The patient may return to work/school on 08/28/2024 with no restrictions (must be fever free and symptoms improving for 24 hours). If you have any questions or concerns, or if I can be of further assistance, please do not hesitate to contact me.    Sincerely,    Demarco Mendes NP

## 2025-03-10 ENCOUNTER — HOSPITAL ENCOUNTER (EMERGENCY)
Facility: HOSPITAL | Age: 30
Discharge: HOME | End: 2025-03-10
Attending: EMERGENCY MEDICINE
Payer: COMMERCIAL

## 2025-03-10 VITALS
BODY MASS INDEX: 38.43 KG/M2 | HEART RATE: 76 BPM | WEIGHT: 290 LBS | HEIGHT: 73 IN | RESPIRATION RATE: 18 BRPM | DIASTOLIC BLOOD PRESSURE: 77 MMHG | SYSTOLIC BLOOD PRESSURE: 120 MMHG | TEMPERATURE: 99 F | OXYGEN SATURATION: 98 %

## 2025-03-10 DIAGNOSIS — R07.9 CHEST PAIN: ICD-10-CM

## 2025-03-10 DIAGNOSIS — R00.2 PALPITATIONS: Primary | ICD-10-CM

## 2025-03-10 LAB
ALBUMIN SERPL BCP-MCNC: 4.2 G/DL (ref 3.5–5.2)
ALP SERPL-CCNC: 108 U/L (ref 40–150)
ALT SERPL W/O P-5'-P-CCNC: 54 U/L (ref 10–44)
ANION GAP SERPL CALC-SCNC: 12 MMOL/L (ref 8–16)
AST SERPL-CCNC: 29 U/L (ref 10–40)
BASOPHILS # BLD AUTO: 0.03 K/UL (ref 0–0.2)
BASOPHILS NFR BLD: 0.5 % (ref 0–1.9)
BILIRUB SERPL-MCNC: 0.4 MG/DL (ref 0.1–1)
BUN SERPL-MCNC: 14 MG/DL (ref 6–20)
CALCIUM SERPL-MCNC: 9.7 MG/DL (ref 8.7–10.5)
CHLORIDE SERPL-SCNC: 105 MMOL/L (ref 95–110)
CO2 SERPL-SCNC: 22 MMOL/L (ref 23–29)
CREAT SERPL-MCNC: 0.8 MG/DL (ref 0.5–1.4)
DIFFERENTIAL METHOD BLD: NORMAL
EOSINOPHIL # BLD AUTO: 0.2 K/UL (ref 0–0.5)
EOSINOPHIL NFR BLD: 3.7 % (ref 0–8)
ERYTHROCYTE [DISTWIDTH] IN BLOOD BY AUTOMATED COUNT: 12.7 % (ref 11.5–14.5)
EST. GFR  (NO RACE VARIABLE): >60 ML/MIN/1.73 M^2
GLUCOSE SERPL-MCNC: 109 MG/DL (ref 70–110)
HCT VFR BLD AUTO: 43.3 % (ref 40–54)
HGB BLD-MCNC: 14.8 G/DL (ref 14–18)
IMM GRANULOCYTES # BLD AUTO: 0.02 K/UL (ref 0–0.04)
IMM GRANULOCYTES NFR BLD AUTO: 0.3 % (ref 0–0.5)
LYMPHOCYTES # BLD AUTO: 1.7 K/UL (ref 1–4.8)
LYMPHOCYTES NFR BLD: 27.8 % (ref 18–48)
MCH RBC QN AUTO: 29.8 PG (ref 27–31)
MCHC RBC AUTO-ENTMCNC: 34.2 G/DL (ref 32–36)
MCV RBC AUTO: 87 FL (ref 82–98)
MONOCYTES # BLD AUTO: 0.4 K/UL (ref 0.3–1)
MONOCYTES NFR BLD: 7.1 % (ref 4–15)
NEUTROPHILS # BLD AUTO: 3.6 K/UL (ref 1.8–7.7)
NEUTROPHILS NFR BLD: 60.6 % (ref 38–73)
NRBC BLD-RTO: 0 /100 WBC
PLATELET # BLD AUTO: 177 K/UL (ref 150–450)
PMV BLD AUTO: 12.7 FL (ref 9.2–12.9)
POTASSIUM SERPL-SCNC: 3.9 MMOL/L (ref 3.5–5.1)
PROT SERPL-MCNC: 8 G/DL (ref 6–8.4)
RBC # BLD AUTO: 4.97 M/UL (ref 4.6–6.2)
SODIUM SERPL-SCNC: 139 MMOL/L (ref 136–145)
TROPONIN I SERPL DL<=0.01 NG/ML-MCNC: <0.006 NG/ML (ref 0–0.03)
WBC # BLD AUTO: 5.93 K/UL (ref 3.9–12.7)

## 2025-03-10 PROCEDURE — 93010 ELECTROCARDIOGRAM REPORT: CPT | Mod: ,,, | Performed by: INTERNAL MEDICINE

## 2025-03-10 PROCEDURE — 85025 COMPLETE CBC W/AUTO DIFF WBC: CPT | Performed by: EMERGENCY MEDICINE

## 2025-03-10 PROCEDURE — 36415 COLL VENOUS BLD VENIPUNCTURE: CPT | Performed by: EMERGENCY MEDICINE

## 2025-03-10 PROCEDURE — 99285 EMERGENCY DEPT VISIT HI MDM: CPT | Mod: 25

## 2025-03-10 PROCEDURE — 80053 COMPREHEN METABOLIC PANEL: CPT | Performed by: EMERGENCY MEDICINE

## 2025-03-10 PROCEDURE — 84484 ASSAY OF TROPONIN QUANT: CPT | Performed by: EMERGENCY MEDICINE

## 2025-03-10 PROCEDURE — 93005 ELECTROCARDIOGRAM TRACING: CPT

## 2025-03-10 NOTE — ED PROVIDER NOTES
Encounter Date: 3/10/2025       History     Chief Complaint   Patient presents with    Chest Pain     Sudden onset this am with SOB       29-year-old male no significant medical history presents with palpitations that began this morning when he was reaching into the drive for some clothing.  Sudden onset, lasted about 5 minutes.  He felt short of breath when this occurred.  He felt a little confused.  Symptoms have now completely resolved.  He has a multiple similar episodes over the last few months but never went to the doctor.  He has no primary care physician.  Drinks occasionally, no cigarettes no drugs.  Patient reports that he travels a lot for work.  He has been having to go to [place] every other week for about a week at a time.  He has an 8-year-old and 2-year-old.  He becomes tearful when discussing this.  States work has been stressful.  Triage note states chest pain but the patient reports primarily palpitations rather than chest heaviness or pressure.  He has no shortness of breath at this time no ripping or tearing pain no history of PE or DVT.  Family history of CAD but in much older relatives.    The history is provided by the patient.     Review of patient's allergies indicates:   Allergen Reactions    Peanut Hives, Shortness Of Breath and Swelling     Past Medical History:   Diagnosis Date    Asthma, mild intermittent      Past Surgical History:   Procedure Laterality Date    FINGER SURGERY  9-10-13    right thumb    FRACTURE SURGERY       No family history on file.  Social History[1]  Review of Systems   Respiratory:  Positive for shortness of breath.    Cardiovascular:  Positive for palpitations. Negative for chest pain.       Physical Exam     Initial Vitals   BP Pulse Resp Temp SpO2   03/10/25 0648 03/10/25 0648 03/10/25 0648 03/10/25 0651 03/10/25 0648   (!) 160/98 86 (!) 22 98 °F (36.7 °C) 100 %      MAP       --                Physical Exam    Nursing note and vitals  reviewed.  Constitutional: He appears well-developed and well-nourished. He is not diaphoretic.  Non-toxic appearance. He does not have a sickly appearance. He does not appear ill. No distress.   HENT:   Head: Normocephalic and atraumatic.   Eyes: EOM are normal.   Neck: Neck supple.   Normal range of motion.  Cardiovascular:  Normal rate, regular rhythm and normal heart sounds.     Exam reveals no gallop and no friction rub.       No murmur heard.  Pulmonary/Chest: Breath sounds normal. No respiratory distress. He has no wheezes. He has no rhonchi. He has no rales.   Musculoskeletal:         General: Normal range of motion.      Cervical back: Normal range of motion and neck supple. No rigidity. Normal range of motion.     Neurological: He is alert and oriented to person, place, and time.   Skin: Skin is warm and dry. No rash noted.   Psychiatric: His behavior is normal. Judgment and thought content normal. He does not exhibit a depressed mood.   Tearful but denies feeling depressed         ED Course   Procedures  Labs Reviewed   COMPREHENSIVE METABOLIC PANEL - Abnormal       Result Value    Sodium 139      Potassium 3.9      Chloride 105      CO2 22 (*)     Glucose 109      BUN 14      Creatinine 0.8      Calcium 9.7      Total Protein 8.0      Albumin 4.2      Total Bilirubin 0.4      Alkaline Phosphatase 108      AST 29      ALT 54 (*)     eGFR >60      Anion Gap 12     CBC W/ AUTO DIFFERENTIAL    WBC 5.93      RBC 4.97      Hemoglobin 14.8      Hematocrit 43.3      MCV 87      MCH 29.8      MCHC 34.2      RDW 12.7      Platelets 177      MPV 12.7      Immature Granulocytes 0.3      Gran # (ANC) 3.6      Immature Grans (Abs) 0.02      Lymph # 1.7      Mono # 0.4      Eos # 0.2      Baso # 0.03      nRBC 0      Gran % 60.6      Lymph % 27.8      Mono % 7.1      Eosinophil % 3.7      Basophil % 0.5      Differential Method Automated     TROPONIN I    Troponin I <0.006            Imaging Results              X-Ray  Chest PA And Lateral (Final result)  Result time 03/10/25 08:02:07      Final result by Clyde Bolden MD (03/10/25 08:02:07)                   Impression:      Negative chest.      Electronically signed by: Clyde Bolden MD  Date:    03/10/2025  Time:    08:02               Narrative:    EXAMINATION:  XR CHEST PA AND LATERAL    CLINICAL HISTORY:  Chest Pain;    TECHNIQUE:  PA and lateral views of the chest were performed.    COMPARISON:  None    FINDINGS:  The cardiomediastinal silhouette is within normal limits.  The lungs are well expanded without consolidation or pleural effusion.                                       Medications - No data to display  Medical Decision Making  29-year-old male no major medical problems, no primary care provider presents with an episode of palpitations that lasted for 5 minutes this morning.  Some associated chest discomfort and shortness of breath.  Symptoms resolved on their own.  He felt a little confused during this episode.  All symptoms resolved on arrival.  He reports multiple episodes over the last several months.  He has not gone to the ER or seen a doctor for this.  ER workup at this time is unremarkable with a normal EKG.  No PE risk factors I do not suspect dissection.  Troponin is negative.  Patient has to leave frequently and go out of state for work for about a week at a time and is away from his 2 children.  This is obviously distressing for the patient as he becomes tearful when discussing this.  Consideration is made for anxiety causing his symptoms but it does seem like he has had symptoms for several months so something like SVT is also concern.  Follow up primary care.  He is referred to Dr. Jeffers who is the family physician.  No indication for further testing or hospitalization.  Return precautions discussed with the patient and mother at the bedside.    Amount and/or Complexity of Data Reviewed  Labs: ordered. Decision-making details documented in  ED Course.  Radiology: ordered. Decision-making details documented in ED Course.  ECG/medicine tests: ordered and independent interpretation performed. Decision-making details documented in ED Course.               ED Course as of 03/10/25 0849   Mon Mar 10, 2025   0703 BP(!): 160/98 [EF]   0703 Temp: 98 °F (36.7 °C) [EF]   0703 Temp Source: Oral [EF]   0703 Pulse: 86 [EF]   0703 Resp(!): 22 [EF]   0703 SpO2: 100 % [EF]   0749 Sinus rhythm normal axis no ST elevation or depression or T-wave inversion independently interpreted 78 beats per minute [EF]   0813 X-Ray Chest PA And Lateral [EF]   0825 Troponin I: <0.006 [EF]      ED Course User Index  [EF] Stephen Luna MD                           Clinical Impression:  Final diagnoses:  [R07.9] Chest pain  [R00.2] Palpitations (Primary)          ED Disposition Condition    Discharge Stable          ED Prescriptions    None       Follow-up Information       Follow up With Specialties Details Why Contact Info Additional Information    Bryan Jeffers III, MD Family Medicine Schedule an appointment as soon as possible for a visit   [address] [facility]  [address]  [facility] [address]  [phone]       [facility]  As needed, If symptoms worsen [address] [place] [address] [address] [place] [id number] 1st floor                 [1]   Social History  Tobacco Use    Smoking status: Never    Smokeless tobacco: Never   Substance Use Topics    Alcohol use: No    Drug use: Never        Stephen Luna MD  03/10/25 0826

## 2025-03-11 LAB
OHS QRS DURATION: 80 MS
OHS QTC CALCULATION: 412 MS

## 2025-07-10 ENCOUNTER — OFFICE VISIT (OUTPATIENT)
Dept: FAMILY MEDICINE | Facility: CLINIC | Age: 30
End: 2025-07-10
Payer: COMMERCIAL

## 2025-07-10 VITALS
WEIGHT: 285.63 LBS | SYSTOLIC BLOOD PRESSURE: 132 MMHG | HEART RATE: 87 BPM | DIASTOLIC BLOOD PRESSURE: 80 MMHG | HEIGHT: 73 IN | OXYGEN SATURATION: 96 % | TEMPERATURE: 98 F | BODY MASS INDEX: 37.86 KG/M2

## 2025-07-10 DIAGNOSIS — R07.9 CHEST PAIN, UNSPECIFIED TYPE: ICD-10-CM

## 2025-07-10 DIAGNOSIS — R06.83 SNORING: ICD-10-CM

## 2025-07-10 DIAGNOSIS — R53.83 FATIGUE, UNSPECIFIED TYPE: ICD-10-CM

## 2025-07-10 DIAGNOSIS — Z00.00 PHYSICAL EXAM: Primary | ICD-10-CM

## 2025-07-10 DIAGNOSIS — Z23 NEED FOR TDAP VACCINATION: ICD-10-CM

## 2025-07-10 DIAGNOSIS — J45.20 MILD INTERMITTENT ASTHMA WITHOUT COMPLICATION: ICD-10-CM

## 2025-07-10 PROCEDURE — 99999 PR PBB SHADOW E&M-EST. PATIENT-LVL IV: CPT | Mod: PBBFAC,,, | Performed by: FAMILY MEDICINE

## 2025-07-10 PROCEDURE — 90715 TDAP VACCINE 7 YRS/> IM: CPT | Mod: S$GLB,,, | Performed by: FAMILY MEDICINE

## 2025-07-10 PROCEDURE — 3008F BODY MASS INDEX DOCD: CPT | Mod: CPTII,S$GLB,, | Performed by: FAMILY MEDICINE

## 2025-07-10 PROCEDURE — 3079F DIAST BP 80-89 MM HG: CPT | Mod: CPTII,S$GLB,, | Performed by: FAMILY MEDICINE

## 2025-07-10 PROCEDURE — 1160F RVW MEDS BY RX/DR IN RCRD: CPT | Mod: CPTII,S$GLB,, | Performed by: FAMILY MEDICINE

## 2025-07-10 PROCEDURE — 99213 OFFICE O/P EST LOW 20 MIN: CPT | Mod: 25,S$GLB,, | Performed by: FAMILY MEDICINE

## 2025-07-10 PROCEDURE — 3075F SYST BP GE 130 - 139MM HG: CPT | Mod: CPTII,S$GLB,, | Performed by: FAMILY MEDICINE

## 2025-07-10 PROCEDURE — 99385 PREV VISIT NEW AGE 18-39: CPT | Mod: 25,S$GLB,, | Performed by: FAMILY MEDICINE

## 2025-07-10 PROCEDURE — 1159F MED LIST DOCD IN RCRD: CPT | Mod: CPTII,S$GLB,, | Performed by: FAMILY MEDICINE

## 2025-07-10 PROCEDURE — 90471 IMMUNIZATION ADMIN: CPT | Mod: S$GLB,,, | Performed by: FAMILY MEDICINE

## 2025-07-10 RX ORDER — ALBUTEROL SULFATE 90 UG/1
2 INHALANT RESPIRATORY (INHALATION) EVERY 6 HOURS PRN
Qty: 18 G | Refills: 2 | Status: SHIPPED | OUTPATIENT
Start: 2025-07-10 | End: 2026-07-10

## 2025-07-10 RX ORDER — EPINEPHRINE 0.3 MG/.3ML
1 INJECTION SUBCUTANEOUS
Qty: 2 EACH | Refills: 0 | Status: SHIPPED | OUTPATIENT
Start: 2025-07-10 | End: 2026-07-10

## 2025-07-12 NOTE — PROGRESS NOTES
Subjective:       Patient ID: Thom Emmanuel III is a 29 y.o. male.    Chief Complaint: Hospital Follow Up (3/25 [facility])    Here for new patient visit.    Social history nonsmoker no alcohol intake.  .  Works out of state.      Family history coronary artery disease diabetes mellitus type 2.    Immunizations needs TD AP.    Past medical history.  Mild intermittent asthma.  Right thumb surgery 10 years ago.  Anaphylaxis to peanuts.  Needs EpiPen refilled.      Review of Systems   Constitutional:  Positive for fatigue.   HENT: Negative.          Snoring.   Eyes: Negative.    Respiratory: Negative.     Cardiovascular:  Positive for chest pain.   Gastrointestinal: Negative.    Endocrine: Negative.    Genitourinary: Negative.    Musculoskeletal: Negative.    Skin: Negative.    Neurological: Negative.    Hematological: Negative.    Psychiatric/Behavioral: Negative.         Objective:      Physical Exam  Vitals reviewed.   Constitutional:       Appearance: Normal appearance. He is well-developed. He is obese.   HENT:      Head: Normocephalic and atraumatic.      Right Ear: Tympanic membrane normal.      Left Ear: Tympanic membrane normal.      Nose: Nose normal.      Mouth/Throat:      Mouth: Mucous membranes are moist.      Pharynx: No oropharyngeal exudate.   Eyes:      Conjunctiva/sclera: Conjunctivae normal.      Pupils: Pupils are equal, round, and reactive to light.   Neck:      Vascular: No carotid bruit.   Cardiovascular:      Rate and Rhythm: Normal rate and regular rhythm.      Pulses: Normal pulses.      Heart sounds: Normal heart sounds. No murmur heard.     No gallop.   Pulmonary:      Effort: Pulmonary effort is normal.      Breath sounds: Normal breath sounds.   Abdominal:      General: Bowel sounds are normal.      Palpations: Abdomen is soft. There is no mass.      Tenderness: There is no abdominal tenderness.   Musculoskeletal:         General: Normal range of motion.       Cervical back: Normal range of motion.   Skin:     General: Skin is warm and dry.   Neurological:      General: No focal deficit present.      Mental Status: He is alert and oriented to person, place, and time.   Psychiatric:         Mood and Affect: Mood normal.         Behavior: Behavior normal.         Assessment:       1. Physical exam    2. Mild intermittent asthma without complication    3. Chest pain, unspecified type    4. Snoring    5. Fatigue, unspecified type    6. Need for Tdap vaccination    7. BMI 37.0-37.9, adult        Plan:       Physical exam  -     CBC Auto Differential; Future; Expected date: 07/24/2025  -     Comprehensive Metabolic Panel; Future; Expected date: 07/24/2025  -     Lipid Panel; Future; Expected date: 07/24/2025  -     Hemoglobin A1C; Future; Expected date: 07/24/2025  -     Testosterone,Total; Future; Expected date: 07/10/2025    Mild intermittent asthma without complication    Chest pain, unspecified type  -     Magnesium; Future; Expected date: 07/24/2025  -     TSH; Future; Expected date: 07/24/2025  -     Echo; Future  -     Home Sleep Study; Future  -     Cardiac Monitor - 3-15 Day Adult (Cupid Only); Future    Snoring  -     Home Sleep Study; Future    Fatigue, unspecified type  -     Testosterone,Total; Future; Expected date: 07/10/2025  -     Echo; Future  -     Home Sleep Study; Future    Need for Tdap vaccination  -     DIPH,PERTUSS(ACEL),TET VAC(PF)(ADULT)(ADACEL)(TDaP)    BMI 37.0-37.9, adult    Other orders  -     EPINEPHrine (EPIPEN) 0.3 mg/0.3 mL AtIn; Inject 0.3 mLs (0.3 mg total) into the muscle as needed (shortness of breath, facial swelling).  Dispense: 2 each; Refill: 0  -     albuterol (PROVENTIL HFA) 90 mcg/actuation inhaler; Inhale 2 puffs into the lungs every 6 (six) hours as needed for Wheezing. Rescue  Dispense: 18 g; Refill: 2    Refill EpiPen.  TD AP today.  Lipids TSH magnesium CMP CBC A1c testosterone level.  Albuterol HFA 2 puffs q.4 to 6 hours  p.r.n. wheezing.    In his routine visit having some chest pain palpitations fatigue severe snoring daytime somnolence.  Leans over and will get chest pain at times sudden last up to 15 seconds or so went to the emergency room.  He had had little cough no fever chills.  EKG was okay.  Also notes it leaning on his right elbow.  No exertional chest pain but not exercising.  Cholesterol unknown.  Has some palpitations with it.    Physical examination.  Well-developed well-nourished male overweight no acute distress.  Neck without bruit no adenopathy good range of motion.  Chest clear.  Heart regular rate rhythm.  Abdomen bowel sounds positive soft nontender.  Unable to reproduce the chest discomfort.    Impression.  Chest pain.  Palpitations.  Snoring fatigue.      Plan Holter monitor.  Electrolytes plus magnesium and TSH.  Home sleep study.  Echocardiogram.

## 2025-07-25 ENCOUNTER — HOSPITAL ENCOUNTER (OUTPATIENT)
Dept: CARDIOLOGY | Facility: CLINIC | Age: 30
Discharge: HOME | End: 2025-07-25
Attending: FAMILY MEDICINE
Payer: COMMERCIAL

## 2025-07-25 ENCOUNTER — HOSPITAL ENCOUNTER (OUTPATIENT)
Dept: CARDIOLOGY | Facility: HOSPITAL | Age: 30
Discharge: HOME | End: 2025-07-25
Attending: FAMILY MEDICINE
Payer: COMMERCIAL

## 2025-07-25 VITALS — BODY MASS INDEX: 37.77 KG/M2 | WEIGHT: 285 LBS | HEIGHT: 73 IN

## 2025-07-25 DIAGNOSIS — R07.9 CHEST PAIN, UNSPECIFIED TYPE: ICD-10-CM

## 2025-07-25 DIAGNOSIS — R53.83 FATIGUE, UNSPECIFIED TYPE: ICD-10-CM

## 2025-07-25 LAB — OHS CV CPX PATIENT HEIGHT IN: 73

## 2025-07-25 PROCEDURE — 93306 TTE W/DOPPLER COMPLETE: CPT | Mod: 26,,, | Performed by: INTERNAL MEDICINE

## 2025-07-25 PROCEDURE — 93306 TTE W/DOPPLER COMPLETE: CPT

## 2025-07-25 PROCEDURE — 93242 EXT ECG>48HR<7D RECORDING: CPT | Mod: ,,, | Performed by: GENERAL PRACTICE

## 2025-07-25 PROCEDURE — 93244 EXT ECG>48HR<7D REV&INTERPJ: CPT | Mod: ,,, | Performed by: GENERAL PRACTICE

## 2025-07-26 LAB
AORTIC ROOT ANNULUS: 3 CM
AORTIC VALVE CUSP SEPERATION: 1.9 CM
APICAL FOUR CHAMBER EJECTION FRACTION: 65 %
AV INDEX (PROSTH): 0.94
AV MEAN GRADIENT: 3 MMHG
AV PEAK GRADIENT: 7 MMHG
AV VALVE AREA BY VELOCITY RATIO: 2.9 CM²
AV VALVE AREA: 3 CM²
AV VELOCITY RATIO: 0.92
BSA FOR ECHO PROCEDURE: 2.58 M2
CV ECHO LV RWT: 0.43 CM
DOP CALC AO PEAK VEL: 1.3 M/S
DOP CALC AO VTI: 23.5 CM
DOP CALC LVOT AREA: 3.1 CM2
DOP CALC LVOT DIAMETER: 2 CM
DOP CALC LVOT PEAK VEL: 1.2 M/S
DOP CALC LVOT STROKE VOLUME: 69.7 CM3
DOP CALCLVOT PEAK VEL VTI: 22.2 CM
E WAVE DECELERATION TIME: 174 MSEC
E/A RATIO: 1.6
E/E' RATIO: 7 M/S
ECHO LV POSTERIOR WALL: 1 CM (ref 0.6–1.1)
FRACTIONAL SHORTENING: 40.4 % (ref 28–44)
INTERVENTRICULAR SEPTUM: 0.8 CM (ref 0.6–1.1)
IVRT: 79 MSEC
LEFT ATRIUM SIZE: 3.8 CM
LEFT INTERNAL DIMENSION IN SYSTOLE: 2.8 CM (ref 2.1–4)
LEFT VENTRICLE DIASTOLIC VOLUME INDEX: 40.4 ML/M2
LEFT VENTRICLE DIASTOLIC VOLUME: 101 ML
LEFT VENTRICLE END DIASTOLIC VOLUME APICAL 4 CHAMBER: 95.8 ML
LEFT VENTRICLE MASS INDEX: 57.1 G/M2
LEFT VENTRICLE SYSTOLIC VOLUME INDEX: 11.6 ML/M2
LEFT VENTRICLE SYSTOLIC VOLUME: 29 ML
LEFT VENTRICULAR INTERNAL DIMENSION IN DIASTOLE: 4.7 CM (ref 3.5–6)
LEFT VENTRICULAR MASS: 142.7 G
LV LATERAL E/E' RATIO: 5.6 M/S
LV SEPTAL E/E' RATIO: 10.7 M/S
LVED V (TEICH): 101 ML
LVES V (TEICH): 28.5 ML
LVOT MG: 3 MMHG
LVOT MV: 0.73 CM/S
MV PEAK A VEL: 0.6 M/S
MV PEAK E VEL: 0.96 M/S
MV STENOSIS PRESSURE HALF TIME: 53 MS
MV VALVE AREA P 1/2 METHOD: 4.15 CM2
OHS CV CPX PATIENT HEIGHT IN: 73
OHS CV RV/LV RATIO: 0.66 CM
PISA TR MAX VEL: 2.3 M/S
PV MV: 0.87 M/S
PV PEAK GRADIENT: 6 MMHG
PV PEAK VELOCITY: 1.25 M/S
RA PRESSURE ESTIMATED: 3 MMHG
RIGHT VENTRICLE DIASTOLIC BASEL DIMENSION: 3.1 CM
RIGHT VENTRICULAR END-DIASTOLIC DIMENSION: 3.08 CM
RV TB RVSP: 5 MMHG
TDI LATERAL: 0.17 M/S
TDI SEPTAL: 0.09 M/S
TDI: 0.13 M/S
TR MAX PG: 20 MMHG
TV REST PULMONARY ARTERY PRESSURE: 24 MMHG
Z-SCORE OF LEFT VENTRICULAR DIMENSION IN END DIASTOLE: -10.86
Z-SCORE OF LEFT VENTRICULAR DIMENSION IN END SYSTOLE: -8.55

## 2025-07-27 ENCOUNTER — RESULTS FOLLOW-UP (OUTPATIENT)
Dept: FAMILY MEDICINE | Facility: CLINIC | Age: 30
End: 2025-07-27
Payer: COMMERCIAL

## 2025-08-01 LAB
OHS CV CPX PATIENT HEIGHT IN: 73
OHS CV EVENT MONITOR DAY: 2
OHS CV HOLTER HOOKUP DATE: NORMAL
OHS CV HOLTER HOOKUP TIME: NORMAL
OHS CV HOLTER LENGTH DECIMAL HOURS: 60
OHS CV HOLTER LENGTH HOURS: 12
OHS CV HOLTER LENGTH MINUTES: 0
OHS CV HOLTER SCAN DATE: NORMAL
OHS CV HOLTER SINUS AVERAGE HR: 83 BPM
OHS CV HOLTER SINUS MAX HR: 245 BPM
OHS CV HOLTER SINUS MIN HR: 47 BPM
OHS CV HOLTER STUDY END DATE: NORMAL
OHS CV HOLTER STUDY END TIME: NORMAL

## 2025-08-08 ENCOUNTER — LAB VISIT (OUTPATIENT)
Dept: LAB | Facility: HOSPITAL | Age: 30
End: 2025-08-08
Attending: FAMILY MEDICINE
Payer: COMMERCIAL

## 2025-08-08 ENCOUNTER — OFFICE VISIT (OUTPATIENT)
Dept: FAMILY MEDICINE | Facility: CLINIC | Age: 30
End: 2025-08-08
Payer: COMMERCIAL

## 2025-08-08 VITALS
HEIGHT: 73 IN | OXYGEN SATURATION: 98 % | HEART RATE: 79 BPM | WEIGHT: 286.69 LBS | BODY MASS INDEX: 38 KG/M2 | DIASTOLIC BLOOD PRESSURE: 70 MMHG | TEMPERATURE: 98 F | SYSTOLIC BLOOD PRESSURE: 122 MMHG

## 2025-08-08 DIAGNOSIS — R07.9 CHEST PAIN, UNSPECIFIED TYPE: ICD-10-CM

## 2025-08-08 DIAGNOSIS — R53.83 FATIGUE, UNSPECIFIED TYPE: ICD-10-CM

## 2025-08-08 DIAGNOSIS — R00.2 PALPITATIONS: ICD-10-CM

## 2025-08-08 DIAGNOSIS — I47.10 SVT (SUPRAVENTRICULAR TACHYCARDIA): ICD-10-CM

## 2025-08-08 DIAGNOSIS — Z00.00 PHYSICAL EXAM: ICD-10-CM

## 2025-08-08 LAB
ABSOLUTE EOSINOPHIL (SMH): 0.1 K/UL
ABSOLUTE MONOCYTE (SMH): 0.45 K/UL (ref 0.3–1)
ABSOLUTE NEUTROPHIL COUNT (SMH): 4.3 K/UL (ref 1.8–7.7)
ALBUMIN SERPL-MCNC: 4.8 G/DL (ref 3.5–5.2)
ALP SERPL-CCNC: 96 UNIT/L (ref 55–135)
ALT SERPL-CCNC: 35 UNIT/L (ref 10–44)
ANION GAP (SMH): 5 MMOL/L (ref 8–16)
AST SERPL-CCNC: 18 UNIT/L (ref 10–40)
BASOPHILS # BLD AUTO: 0.04 K/UL
BASOPHILS NFR BLD AUTO: 0.6 %
BILIRUB SERPL-MCNC: 0.5 MG/DL (ref 0.1–1)
BUN SERPL-MCNC: 15 MG/DL (ref 6–20)
CALCIUM SERPL-MCNC: 10.1 MG/DL (ref 8.7–10.5)
CHLORIDE SERPL-SCNC: 103 MMOL/L (ref 95–110)
CHOLEST SERPL-MCNC: 201 MG/DL (ref 120–199)
CHOLEST/HDLC SERPL: 4.7 {RATIO} (ref 2–5)
CO2 SERPL-SCNC: 31 MMOL/L (ref 23–29)
CREAT SERPL-MCNC: 0.9 MG/DL (ref 0.5–1.4)
EAG (SMH): 117 MG/DL (ref 68–131)
ERYTHROCYTE [DISTWIDTH] IN BLOOD BY AUTOMATED COUNT: 13.1 % (ref 11.5–14.5)
GFR SERPLBLD CREATININE-BSD FMLA CKD-EPI: >60 ML/MIN/1.73/M2
GLUCOSE SERPL-MCNC: 110 MG/DL (ref 70–110)
HBA1C MFR BLD: 5.7 % (ref 4.5–6.2)
HCT VFR BLD AUTO: 43.8 % (ref 40–54)
HDLC SERPL-MCNC: 43 MG/DL (ref 40–75)
HDLC SERPL: 21.4 % (ref 20–50)
HGB BLD-MCNC: 14.6 GM/DL (ref 14–18)
IMM GRANULOCYTES # BLD AUTO: 0.02 K/UL (ref 0–0.04)
IMM GRANULOCYTES NFR BLD AUTO: 0.3 % (ref 0–0.5)
LDLC SERPL CALC-MCNC: 134.4 MG/DL (ref 63–159)
LYMPHOCYTES # BLD AUTO: 1.28 K/UL (ref 1–4.8)
MAGNESIUM SERPL-MCNC: 2 MG/DL (ref 1.6–2.6)
MCH RBC QN AUTO: 29.9 PG (ref 27–31)
MCHC RBC AUTO-ENTMCNC: 33.3 G/DL (ref 32–36)
MCV RBC AUTO: 90 FL (ref 82–98)
NONHDLC SERPL-MCNC: 158 MG/DL
NUCLEATED RBC (/100WBC) (SMH): 0 /100 WBC
PLATELET # BLD AUTO: 216 K/UL (ref 150–450)
PMV BLD AUTO: 12.7 FL (ref 9.2–12.9)
POTASSIUM SERPL-SCNC: 4.3 MMOL/L (ref 3.5–5.1)
PROT SERPL-MCNC: 7.9 GM/DL (ref 6–8.4)
RBC # BLD AUTO: 4.88 M/UL (ref 4.6–6.2)
RELATIVE EOSINOPHIL (SMH): 1.6 % (ref 0–8)
RELATIVE LYMPHOCYTE (SMH): 20.7 % (ref 18–48)
RELATIVE MONOCYTE (SMH): 7.3 % (ref 4–15)
RELATIVE NEUTROPHIL (SMH): 69.5 % (ref 38–73)
SODIUM SERPL-SCNC: 139 MMOL/L (ref 136–145)
TRIGL SERPL-MCNC: 118 MG/DL (ref 30–150)
TSH SERPL-ACNC: 1.25 UIU/ML (ref 0.34–5.6)
WBC # BLD AUTO: 6.18 K/UL (ref 3.9–12.7)

## 2025-08-08 PROCEDURE — 80061 LIPID PANEL: CPT

## 2025-08-08 PROCEDURE — 85025 COMPLETE CBC W/AUTO DIFF WBC: CPT

## 2025-08-08 PROCEDURE — 84443 ASSAY THYROID STIM HORMONE: CPT

## 2025-08-08 PROCEDURE — 84403 ASSAY OF TOTAL TESTOSTERONE: CPT

## 2025-08-08 PROCEDURE — 83036 HEMOGLOBIN GLYCOSYLATED A1C: CPT

## 2025-08-08 PROCEDURE — 99999 PR PBB SHADOW E&M-EST. PATIENT-LVL IV: CPT | Mod: PBBFAC,,, | Performed by: FAMILY MEDICINE

## 2025-08-08 PROCEDURE — 80053 COMPREHEN METABOLIC PANEL: CPT

## 2025-08-08 PROCEDURE — 36415 COLL VENOUS BLD VENIPUNCTURE: CPT

## 2025-08-08 PROCEDURE — 83735 ASSAY OF MAGNESIUM: CPT

## 2025-08-08 RX ORDER — METOPROLOL SUCCINATE 25 MG/1
25 TABLET, EXTENDED RELEASE ORAL DAILY
Qty: 30 TABLET | Refills: 5 | Status: SHIPPED | OUTPATIENT
Start: 2025-08-08

## 2025-08-08 NOTE — PROGRESS NOTES
SCRIBE #1 NOTE: I, Cem Roldan, am scribing for, and in the presence of, Bryan Jeffers III, MD. I have scribed the entire note.     Subjective:       Patient ID: Thom Emmanuel III is a 29 y.o. male.    Chief Complaint: Follow-up (Holter monitor )    Mr. Thom Emmanuel III is here for a follow up after his last appointment on 7/10/2025. BMI of 37.83. No chest pain. Palpitations. Blood pressure is 122/70. SVT. Tachycardia occurred lasting 18 beats with a max rate of 245 bpm. The patient reports feeling symptomatic for about 10 seconds during one afternoon while jumping at a trampoline park with his kids. He mentions that the longest onset he experienced was 30-60 seconds. The patient reports that he currently experiences palpitations about once or twice per week for the past 4 months. He only drinks 1 cup of coffee in the mornings. The patient does not drink energy drinks such as red bull or sodas. Echo within normal limit. EKG ok. Didn't do labs.    Review of Systems   Constitutional:  Negative for chills and fever.   HENT:  Negative for congestion and sore throat.    Eyes:  Negative for pain and visual disturbance.   Respiratory:  Negative for chest tightness and shortness of breath.    Cardiovascular:  Positive for palpitations. Negative for chest pain.   Gastrointestinal:  Negative for nausea.   Endocrine: Negative for polydipsia and polyuria.   Genitourinary:  Negative for dysuria and flank pain.   Musculoskeletal:  Negative for back pain, neck pain and neck stiffness.   Skin:  Negative for rash.   Allergic/Immunologic: Negative for immunocompromised state.   Neurological:  Negative for dizziness and weakness.   Hematological:  Does not bruise/bleed easily.   Psychiatric/Behavioral:  Negative for agitation and behavioral problems.    All other systems reviewed and are negative.      Objective:      Physical examination: Vital signs noted. No acute distress. No carotid bruit. Regular heart rate and rhythm.  Lungs clear to auscultation bilaterally. Abdomen bowel sounds positive soft and nontender. Extremities without edema. 2+ pedal pulses.      Assessment:       1. BMI 37.0-37.9, adult    2. SVT (supraventricular tachycardia)    3. Palpitations        Plan:       BMI 37.0-37.9, adult    SVT (supraventricular tachycardia)    Palpitations    He did not do his labs as ordered.  Need to get those today.  Given the run of supraventricular tachycardia will start him on Toprol XL 25 daily.  Do the home sleep study as ordered on August 22nd.  Discussed cough or Valsalva maneuver has a means of slowing down the SVT if it occurs again.  Limit caffeine and stimulants.  All care gaps addressed or discussed.     I, Bryan Jeffers III, MD, personally performed the services described in this documentation. All medical record entries made by the scribe were at my direction and in my presence. I have reviewed the chart and agree that the record reflects my personal performance and is accurate and complete.

## 2025-08-09 LAB — TESTOST SERPL-MCNC: 176 NG/DL (ref 264–916)

## 2025-08-13 ENCOUNTER — TELEPHONE (OUTPATIENT)
Dept: FAMILY MEDICINE | Facility: CLINIC | Age: 30
End: 2025-08-13
Payer: COMMERCIAL

## 2025-08-13 DIAGNOSIS — R79.89 LOW TESTOSTERONE LEVEL IN MALE: Primary | ICD-10-CM

## 2025-08-21 ENCOUNTER — PROCEDURE VISIT (OUTPATIENT)
Dept: SLEEP MEDICINE | Facility: HOSPITAL | Age: 30
End: 2025-08-21
Attending: FAMILY MEDICINE
Payer: COMMERCIAL

## 2025-08-21 DIAGNOSIS — R07.9 CHEST PAIN, UNSPECIFIED TYPE: ICD-10-CM

## 2025-08-21 DIAGNOSIS — R53.83 FATIGUE, UNSPECIFIED TYPE: ICD-10-CM

## 2025-08-21 DIAGNOSIS — R06.83 SNORING: ICD-10-CM

## 2025-08-21 PROCEDURE — 95806 SLEEP STUDY UNATT&RESP EFFT: CPT

## 2025-08-29 ENCOUNTER — TELEPHONE (OUTPATIENT)
Dept: FAMILY MEDICINE | Facility: CLINIC | Age: 30
End: 2025-08-29
Payer: COMMERCIAL